# Patient Record
Sex: MALE | Race: WHITE | URBAN - METROPOLITAN AREA
[De-identification: names, ages, dates, MRNs, and addresses within clinical notes are randomized per-mention and may not be internally consistent; named-entity substitution may affect disease eponyms.]

---

## 2020-09-02 ENCOUNTER — OFFICE VISIT (OUTPATIENT)
Dept: URBAN - METROPOLITAN AREA CLINIC 105 | Facility: CLINIC | Age: 25
End: 2020-09-02

## 2020-09-29 ENCOUNTER — CLAIMS CREATED FROM THE CLAIM WINDOW (OUTPATIENT)
Dept: URBAN - METROPOLITAN AREA CLINIC 105 | Facility: CLINIC | Age: 25
End: 2020-09-29
Payer: COMMERCIAL

## 2020-09-29 ENCOUNTER — LAB OUTSIDE AN ENCOUNTER (OUTPATIENT)
Dept: URBAN - METROPOLITAN AREA CLINIC 105 | Facility: CLINIC | Age: 25
End: 2020-09-29

## 2020-09-29 DIAGNOSIS — K92.1 RECTAL BLEEDING: ICD-10-CM

## 2020-09-29 DIAGNOSIS — K21.9 GASTROESOPHAGEAL REFLUX DISEASE, ESOPHAGITIS PRESENCE NOT SPECIFIED: ICD-10-CM

## 2020-09-29 PROBLEM — 267103008 GLOBUS SENSATION: Status: ACTIVE | Noted: 2020-09-29

## 2020-09-29 PROCEDURE — G8427 DOCREV CUR MEDS BY ELIG CLIN: HCPCS | Performed by: INTERNAL MEDICINE

## 2020-09-29 PROCEDURE — 99204 OFFICE O/P NEW MOD 45 MIN: CPT | Performed by: INTERNAL MEDICINE

## 2020-09-29 PROCEDURE — G9903 PT SCRN TBCO ID AS NON USER: HCPCS | Performed by: INTERNAL MEDICINE

## 2020-09-29 PROCEDURE — 1036F TOBACCO NON-USER: CPT | Performed by: INTERNAL MEDICINE

## 2020-09-29 PROCEDURE — G8420 CALC BMI NORM PARAMETERS: HCPCS | Performed by: INTERNAL MEDICINE

## 2020-09-29 RX ORDER — OMEPRAZOLE 40 MG/1
AS DIRECTED CAPSULE, DELAYED RELEASE ORAL
Status: ACTIVE | COMMUNITY

## 2020-09-29 RX ORDER — CETIRIZINE HYDROCHLORIDE 5 MG/1
AS DIRECTED TABLET, FILM COATED ORAL
Status: ACTIVE | COMMUNITY

## 2020-09-29 NOTE — HPI-TODAY'S VISIT:
The patient presents for an itchiness in his throat and chest.  Today, the patient reports "heartburn" that he describes as an itchiness in his chest and throat. Onset was in March. At the time, his PCP put him on omeprazole 40 mg QD. Omeprazole provides resolution except when he is stressed or eats spicier foods. He noted a recurrence of symptoms when he tried to d/c. He also says he coughs when he presses on his throat. This past weekend, he saw BRB in his stool and on the tissue. He denies constipation. He started on the Psyllium Powder to bring more formation to his stools since starting on protein shakes that he says have caused a looser BM.   He drinks soda 2 cans QD and 1 alcohol drink/month. He doesn't drink coffee or eat mints/peppermints. Dinner at 6-7, bed at 11-12 am with snacking in between he states.

## 2020-10-21 ENCOUNTER — OFFICE VISIT (OUTPATIENT)
Dept: URBAN - METROPOLITAN AREA SURGERY CENTER 16 | Facility: SURGERY CENTER | Age: 25
End: 2020-10-21
Payer: COMMERCIAL

## 2020-10-21 DIAGNOSIS — D12.5 ADENOMA OF SIGMOID COLON: ICD-10-CM

## 2020-10-21 DIAGNOSIS — D12.8 ADENOMATOUS POLYP OF RECTUM: ICD-10-CM

## 2020-10-21 DIAGNOSIS — K62.5 ANAL BLEEDING: ICD-10-CM

## 2020-10-21 PROCEDURE — 45385 COLONOSCOPY W/LESION REMOVAL: CPT | Performed by: INTERNAL MEDICINE

## 2020-10-21 PROCEDURE — G9937 DIG OR SURV COLSCO: HCPCS | Performed by: INTERNAL MEDICINE

## 2020-10-21 PROCEDURE — G8907 PT DOC NO EVENTS ON DISCHARG: HCPCS | Performed by: INTERNAL MEDICINE

## 2020-10-21 PROCEDURE — 45380 COLONOSCOPY AND BIOPSY: CPT | Performed by: INTERNAL MEDICINE

## 2020-10-21 RX ORDER — CETIRIZINE HYDROCHLORIDE 5 MG/1
AS DIRECTED TABLET, FILM COATED ORAL
Status: ACTIVE | COMMUNITY

## 2020-10-21 RX ORDER — OMEPRAZOLE 40 MG/1
AS DIRECTED CAPSULE, DELAYED RELEASE ORAL
Status: ACTIVE | COMMUNITY

## 2023-07-24 ENCOUNTER — OFFICE VISIT (OUTPATIENT)
Dept: URBAN - METROPOLITAN AREA CLINIC 105 | Facility: CLINIC | Age: 28
End: 2023-07-24
Payer: COMMERCIAL

## 2023-07-24 ENCOUNTER — WEB ENCOUNTER (OUTPATIENT)
Dept: URBAN - METROPOLITAN AREA CLINIC 105 | Facility: CLINIC | Age: 28
End: 2023-07-24

## 2023-07-24 ENCOUNTER — DASHBOARD ENCOUNTERS (OUTPATIENT)
Age: 28
End: 2023-07-24

## 2023-07-24 ENCOUNTER — LAB OUTSIDE AN ENCOUNTER (OUTPATIENT)
Dept: URBAN - METROPOLITAN AREA CLINIC 105 | Facility: CLINIC | Age: 28
End: 2023-07-24

## 2023-07-24 VITALS
BODY MASS INDEX: 24.53 KG/M2 | DIASTOLIC BLOOD PRESSURE: 78 MMHG | HEART RATE: 60 BPM | HEIGHT: 69 IN | TEMPERATURE: 97 F | SYSTOLIC BLOOD PRESSURE: 133 MMHG | WEIGHT: 165.6 LBS

## 2023-07-24 DIAGNOSIS — K21.9 GASTROESOPHAGEAL REFLUX DISEASE, ESOPHAGITIS PRESENCE NOT SPECIFIED: ICD-10-CM

## 2023-07-24 DIAGNOSIS — Z86.010 PERSONAL HISTORY OF COLONIC POLYPS: ICD-10-CM

## 2023-07-24 PROBLEM — 235595009: Status: ACTIVE | Noted: 2020-09-29

## 2023-07-24 PROBLEM — 428283002: Status: ACTIVE | Noted: 2023-07-24

## 2023-07-24 PROCEDURE — 99214 OFFICE O/P EST MOD 30 MIN: CPT | Performed by: INTERNAL MEDICINE

## 2023-07-24 RX ORDER — FINASTERIDE 1 MG/1
1 TABLET TABLET, FILM COATED ORAL ONCE A DAY
Status: ACTIVE | COMMUNITY

## 2023-07-24 RX ORDER — OMEPRAZOLE 40 MG/1
AS DIRECTED CAPSULE, DELAYED RELEASE ORAL
Status: ACTIVE | COMMUNITY

## 2023-07-24 RX ORDER — CETIRIZINE HYDROCHLORIDE 5 MG/1
AS DIRECTED TABLET, FILM COATED ORAL
Status: ACTIVE | COMMUNITY

## 2023-07-24 NOTE — HPI-TODAY'S VISIT:
PAST MEDICAL HISTORY: On 9/29/20, the patient reported "heartburn" that he described as an itchiness in his chest and throat. Onset was in March. At the time, his PCP put him on omeprazole 40 mg QD. Omeprazole provided resolution except when he was stressed or ate spicier foods. He noted a recurrence of symptoms when he tried to d/c. He also said he coughed when he presses on his throat. This past weekend, he saw BRB in his stool and on the tissue. He denied constipation. He started on the Psyllium Powder to bring more formation to his stools since starting on protein shaked that he said have caused a looser BM.   He drank soda 2 cans QD and 1 alcohol drink/month. He did not drink coffee or eat mints/peppermints. Dinner at 6-7, bed at 11-12 am with snacking in between he stated.  HPI: Patient presents to schedule a surveillance colonoscopy after having 4 tubular adenomas removed on his colonoscopy 3 years ago.  His GERD symptom/s are controlled on omeprazole 10 mg daily and famotidine PRN (takes 1-2x/month).  If he trials off omeprazole he has recurrent symptoms.

## 2023-09-19 ENCOUNTER — OFFICE VISIT (OUTPATIENT)
Dept: URBAN - METROPOLITAN AREA CLINIC 105 | Facility: CLINIC | Age: 28
End: 2023-09-19

## 2023-09-21 ENCOUNTER — CLAIMS CREATED FROM THE CLAIM WINDOW (OUTPATIENT)
Dept: URBAN - METROPOLITAN AREA SURGERY CENTER 16 | Facility: SURGERY CENTER | Age: 28
End: 2023-09-21

## 2023-09-21 ENCOUNTER — OUT OF OFFICE VISIT (OUTPATIENT)
Dept: URBAN - METROPOLITAN AREA SURGERY CENTER 16 | Facility: SURGERY CENTER | Age: 28
End: 2023-09-21
Payer: COMMERCIAL

## 2023-09-21 DIAGNOSIS — Z86.010 ADENOMAS PERSONAL HISTORY OF COLONIC POLYPS: ICD-10-CM

## 2023-09-21 DIAGNOSIS — Z12.11 COLON CANCER SCREENING: ICD-10-CM

## 2023-09-21 PROCEDURE — G0105 COLORECTAL SCRN; HI RISK IND: HCPCS | Performed by: INTERNAL MEDICINE

## 2023-09-21 PROCEDURE — 00811 ANES LWR INTST NDSC NOS: CPT | Performed by: ANESTHESIOLOGY

## 2023-09-21 PROCEDURE — 00811 ANES LWR INTST NDSC NOS: CPT | Performed by: ANESTHESIOLOGIST ASSISTANT

## 2023-09-21 PROCEDURE — G8907 PT DOC NO EVENTS ON DISCHARG: HCPCS | Performed by: INTERNAL MEDICINE
